# Patient Record
Sex: FEMALE | Race: BLACK OR AFRICAN AMERICAN
[De-identification: names, ages, dates, MRNs, and addresses within clinical notes are randomized per-mention and may not be internally consistent; named-entity substitution may affect disease eponyms.]

---

## 2018-01-11 ENCOUNTER — HOSPITAL ENCOUNTER (INPATIENT)
Dept: HOSPITAL 74 - JDEL | Age: 43
LOS: 2 days | Discharge: HOME | DRG: 560 | End: 2018-01-13
Attending: OBSTETRICS & GYNECOLOGY | Admitting: OBSTETRICS & GYNECOLOGY
Payer: COMMERCIAL

## 2018-01-11 VITALS — BODY MASS INDEX: 28.3 KG/M2

## 2018-01-11 DIAGNOSIS — Z3A.37: ICD-10-CM

## 2018-01-11 LAB
ANION GAP SERPL CALC-SCNC: 7 MMOL/L (ref 8–16)
APTT BLD: 25 SECONDS (ref 26.9–34.4)
ARTERIAL BLOOD GAS PCO2: 40 MMHG (ref 35–45)
BASE EXCESS BLDA CALC-SCNC: -1.7 MEQ/L (ref -2–2)
BASOPHILS # BLD: 0.4 % (ref 0–2)
BUN SERPL-MCNC: 7 MG/DL (ref 7–18)
CALCIUM SERPL-MCNC: 8.3 MG/DL (ref 8.5–10.1)
CHLORIDE SERPL-SCNC: 106 MMOL/L (ref 98–107)
CO2 SERPL-SCNC: 25 MMOL/L (ref 21–32)
CREAT SERPL-MCNC: 0.6 MG/DL (ref 0.55–1.02)
DEPRECATED RDW RBC AUTO: 13 % (ref 11.6–15.6)
EOSINOPHIL # BLD: 1 % (ref 0–4.5)
GLUCOSE SERPL-MCNC: 107 MG/DL (ref 74–106)
HCT VFR BLD CALC: 33.8 % (ref 32.4–45.2)
HGB BLD-MCNC: 11.5 GM/DL (ref 10.7–15.3)
INR BLD: 0.96 (ref 0.82–1.09)
LYMPHOCYTES # BLD: 20.3 % (ref 8–40)
MCH RBC QN AUTO: 30.1 PG (ref 25.7–33.7)
MCHC RBC AUTO-ENTMCNC: 33.9 G/DL (ref 32–36)
MCV RBC: 88.6 FL (ref 80–96)
MONOCYTES # BLD AUTO: 7.9 % (ref 3.8–10.2)
NEUTROPHILS # BLD: 70.4 % (ref 42.8–82.8)
PH BLDV: 7.34 [PH] (ref 7.32–7.42)
PLATELET # BLD AUTO: 173 K/MM3 (ref 134–434)
PMV BLD: 9.9 FL (ref 7.5–11.1)
PO2 BLDA: 44.8 MMHG (ref 80–100)
POTASSIUM SERPLBLD-SCNC: 4 MMOL/L (ref 3.5–5.1)
PT PNL PPP: 10.8 SEC (ref 9.98–11.88)
RBC # BLD AUTO: 3.82 M/MM3 (ref 3.6–5.2)
SAO2 % BLDA: 88.6 % (ref 90–98.9)
SODIUM SERPL-SCNC: 138 MMOL/L (ref 136–145)
VENOUS PC02: 45.5 MMHG (ref 38–52)
VENOUS PO2: 34.4 MMHG (ref 28–48)
WBC # BLD AUTO: 9.9 K/MM3 (ref 4–10)

## 2018-01-11 RX ADMIN — Medication SCH MLS/HR: at 20:30

## 2018-01-11 RX ADMIN — Medication SCH MLS/HR: at 19:30

## 2018-01-11 NOTE — HP
Past Medical History





- Primary Care Physician


PCP:: Sharri Griffith





- Admission


Chief Complaint: 42 yrs (AMA), G10,  , 37 weeks gestation , onset LP since 

4.00AM.  admited in early labor


History of Present Illness: 


PNC at , 2 [facility] . Wt gain 56 lbs .


Prenatal work up : A pos, Hbsag neg, Rpr nr, Rubella immune, Sickle cell neg  , 

1 hr Gtt  55, Hiv neg, Gbs neg , Quantifero neg , pap nilm, gc/ct neg 


AMA , Genetic counselling obtained with M, . MaternaT-21 ne, Afp neg 


Anatomy sono normal. Last sono on 17 34.1 wks, , JOAQUIN 14.7cm, , EFW 5'4"(36

%tile), BPP 8/8 .


prenatal course uneventful





History Source: Patient


Limitations to Obtaining History: No Limitations





- Past Medical History


CNS: No: CVA


Cardiovascular: No: HTN, Murmur


Pulmonary: No: Asthma, Bronchitis


Gastrointestinal: Yes: Constipation, Hemorrhoids


Hepatobiliary: No: Hepatitis B


Renal/: No: UTI


...: 10


...Para: 4


...Term: 3


...: 1


...Spon : 2


...Induced : 3


...LMP: 17


... Weeks Gestation by Dates: 40.6


...EDC by Dates: 18


...EDC by Sono: 18 (37 weeks )


Additional OB History: G1 1996 35 weeks  4'2".  G2 sp ab  21 weeks 

still birth.  G3 1999 40 weeks  7'15".  G4 InD AB.  G5 2006 40 

weeks  8'0".  G6 Ind Ab.  G7 2011 40 Weeks  6'9" H/O PPH , H/o 

pack cell transfusion x2.  G8 Ind Ab.  G9 Sp Ab 2016


Heme/Onc: Yes: Anemia


Infectious Disease: Yes: STD's (h/o trichomoniassi in past).  No: AIDS, HIV, 

Tuberculosis


Psych: Yes: Other (none as per patient)


Musculoskeletal: Yes: Other (none)


Endocrine: No: Diabetes Mellitus, Hypothyroidism





- Past Surgical History


Past Surgical History: Yes: None


Hx Myomectomy: No


Hx Transabdominal Cerclage: No





- Smoking History


Have you smoked in the past 12 months: No





- Alcohol/Substance Use


History of Substance Use: reports: None





Home Medications





- Allergies


Allergies/Adverse Reactions: 


 Allergies











Allergy/AdvReac Type Severity Reaction Status Date / Time


 


Sulfa (Sulfonamide Allergy Severe Difficulty Verified 18 12:59





Antibiotics)   Breathing  


 


aspirin Allergy Intermediate Swelling Verified 18 13:34


 


Penicillins Allergy Intermediate Swelling Verified 18 13:34














- Home Medications


Home Medications: 


Ambulatory Orders





Prenatal Vitamins (Sjr) - 1 tab PO DAILY 18 











Physical Exam - Maternity


Vital Signs: 


 Vital Signs











Temperature  97.5 F L  18 12:00


 


Pulse Rate  109 H  18 12:00


 


Respiratory Rate  20   18 12:00


 


Blood Pressure  108/72   18 12:00


 


O2 Sat by Pulse Oximetry (%)      











Constitutional: Yes: Well Nourished, Mild Distress


Eyes: Yes: WNL


HENT: Yes: WNL, Normocephalic


Neck: Yes: WNL


Cardiovascular: Yes: WNL


Lungs: Clear to auscultation


Breast(s): Yes: WNL





- Abdominal Exam/OB


Fundal Height: 38


Number of Fetuses: Single


Fetal Presentation: Vertex (exam at 2.00 pm)


Contractions: Yes


Regularity: Irregular (5-7-10)


Intensity: Mild/Mod


Fetal Monitor Mode: External


Fetal Heart Rate (range): 135 


Fetal Heart Rate Location: [facility]


Category: I


Accelerations: Uniform


Decelerations: None





- Vaginal Exam/OB


Vaginal Bleediing: Bloody Show


Dilatation (cm): 4


Effacement (%): 65


Amniotic Membrane Status: Intact


Fetal Presentation: Vertex/Position


Fetal Station: -3





- Physical Exam


Musculoskeletal: Yes: WNL


Extremities: Yes: WNL.  No: Calf Tenderness


Edema: LLE: 1+, RLE: 1+


Integumentary: Yes: WNL, Tattoos


Deep Tendon Reflex Grade: Normal +2


Psychiatric: Yes: WNL, Alert, Oriented





- Labs


Lab Results: 





 Laboratory Tests











  18





  16:00 16:00


 


WBC  9.9 


 


Hgb  11.5 


 


Hct  33.8 


 


Plt Count  173 


 


Neutrophils %  70.4 


 


Lymphocytes %  20.3 


 


Sodium   138


 


Potassium   4.0


 


Chloride   106


 


BUN   7


 


Creatinine   0.6


 


Random Glucose   107 H


 


Calcium   8.3 L














Problem List





- Problems


(1) AMA (advanced maternal age) multigravida 35+


Code(s): O09.529 - SUPERVISION OF ELDERLY MULTIGRAVIDA, UNSPECIFIED TRIMESTER   





(2) Grand multipara in labor


Code(s): O09.40 - SUPERVISION OF PREGNANCY W GRAND MULTIPARITY, UNSP TRIMESTER 

  


Qualifiers: 


   Trimester: third trimester   Qualified Code(s): O09.43 - Supervision of 

pregnancy with grand multiparity, third trimester   





(3) Pregnancy with 37 weeks completed gestation


Code(s): Z3A.37 - 37 WEEKS GESTATION OF PREGNANCY   





(4) Labor established


Code(s): JFH3613 -    





Assessment/Plan


42 yrs( AMA)  G10. , 37 weeks , admitted in labor 


GBS neg 


Plan vaginal delivery trial 


      pitocin augmentaion prn 


      stadol + phenrgan & or Epidural for labor analgesia

## 2018-01-11 NOTE — PN
Progress Note, Labor


  ** Vaginal Exam #1


Labor Exam Date: 01/11/18


Labor Exam Time: 18:30


Fetal Heart Rate (range): 140


Dilatation: 5


Effacement (%): 70


Amniotic Membrane Status: Ruptured (AROM clear moderate amount)


Fetal Presentation: Vertex/Position


Fetal Station: -2


Remarks: 


fhr cat-1


uc  q 3-6 min 


pt requests for pain meds 


rx iv stadol2 mg + phenrgan 25 mg stat 


 Selected Entries











  01/11/18





  18:00


 


Temperature 98.0 F


 


Pulse Rate 78


 


Blood Pressure 110/66














  ** Vaginal Exam #2


Labor Exam Date: 01/11/18


Labor Exam Time: 19:20


Fetal Heart Rate (range): 110-120


Dilatation: 10


Effacement (%): 100


Amniotic Membrane Status: Ruptured


Fetal Presentation: Vertex/Position


Fetal Station: +2


Remarks: 


fhr cat-2


uc 2-3 min 


pt pushing 


 Selected Entries











  01/11/18 01/11/18





  18:00 19:00


 


Temperature 98.0 F 


 


Pulse Rate 78 82


 


Blood Pressure 110/66 116/69

## 2018-01-11 NOTE — PN
Delivery





- Delivery


Vaginal Delivery: No Problems, Spontaneous (cord around neck x1 loop loose , 

unwinded , before delivery of shoulder. prophylactically iv Pitocin 20 IU put 

in 1000 ml n saline started immdiately after delivery of the baby,. Im 

Methergine 0.2 mg im was given after delivery of placenta. trickling of blood 

was noted . MEU done . Bladder catheterized & emptied 500 ml josiah color urine. 

Perineum & v agina & cervix intact .)


Episiotomy/Laceration: None


EBL (cc): 500





Delivery, Single Birth





- Stages of Labor


Date 1st Stage Initiatied: 18


Time 1st Stage Initiated: 04:00


Date 2nd Stage Initiated: 18


Time 2nd Stage Initiated: 19:20


Date of Delivery: 18


Time of Delivery: 19:28


Date Placenta Delivered: 18


Time Placenta Delivered: 19:33


Placenta: Yes: Spontaneous, Uterine Exploration





- Condition of Infant


Pediatrician/Neonatologist Present: No


Infant Gender: Female


Birth Weight: 5 lb 14 oz


Position: Left, OA





- Apgar


  ** 1 Minute


Apgar Total Score: 9





  ** 5 Minutes


Apgar Total Score: 9





-  Feeding Plan


Initial Plan: Elected not to breastfeed exclusively throughout hospitalization





Remarks





- Remarks


Remarks: 


42 Yrs (AMA),  , 37 weeks admitted in labor .


gbs neg . 


pnc at [address] [place] [address] 


Iv pitocin augmentation was given 


Iv stadol + phenrgan for labor analgesia was given 


Atonic PPH is noted 


  


20.05 hr re exam .: blood trickling from vagina 


                           MEU done again 100 ml blood cllots removed, 


                           ut is not firm.consistently 


                           Rx IM Hemabate 250 mcg stat 


                           v/s BP 93/56  , Pulse 66/min

## 2018-01-12 LAB
BASOPHILS # BLD: 0.4 % (ref 0–2)
DEPRECATED RDW RBC AUTO: 12.8 % (ref 11.6–15.6)
EOSINOPHIL # BLD: 1.1 % (ref 0–4.5)
HCT VFR BLD CALC: 27.9 % (ref 32.4–45.2)
HGB BLD-MCNC: 9.4 GM/DL (ref 10.7–15.3)
LYMPHOCYTES # BLD: 24.7 % (ref 8–40)
MCH RBC QN AUTO: 29.6 PG (ref 25.7–33.7)
MCHC RBC AUTO-ENTMCNC: 33.7 G/DL (ref 32–36)
MCV RBC: 88 FL (ref 80–96)
MONOCYTES # BLD AUTO: 6.2 % (ref 3.8–10.2)
NEUTROPHILS # BLD: 67.6 % (ref 42.8–82.8)
PLATELET # BLD AUTO: 159 K/MM3 (ref 134–434)
PMV BLD: 9.8 FL (ref 7.5–11.1)
RBC # BLD AUTO: 3.17 M/MM3 (ref 3.6–5.2)
WBC # BLD AUTO: 16 K/MM3 (ref 4–10)

## 2018-01-12 RX ADMIN — FERROUS SULFATE TAB EC 324 MG (65 MG FE EQUIVALENT) SCH MG: 324 (65 FE) TABLET DELAYED RESPONSE at 17:05

## 2018-01-12 RX ADMIN — ACETAMINOPHEN PRN MG: 325 TABLET ORAL at 09:56

## 2018-01-12 RX ADMIN — FERROUS SULFATE TAB EC 324 MG (65 MG FE EQUIVALENT) SCH MG: 324 (65 FE) TABLET DELAYED RESPONSE at 09:57

## 2018-01-12 RX ADMIN — IBUPROFEN PRN MG: 600 TABLET, FILM COATED ORAL at 22:14

## 2018-01-12 RX ADMIN — IBUPROFEN PRN MG: 600 TABLET, FILM COATED ORAL at 09:58

## 2018-01-12 RX ADMIN — ACETAMINOPHEN PRN MG: 325 TABLET ORAL at 00:12

## 2018-01-12 RX ADMIN — ACETAMINOPHEN PRN MG: 325 TABLET ORAL at 22:13

## 2018-01-12 RX ADMIN — Medication SCH TAB: at 09:59

## 2018-01-12 NOTE — PN
Progress Note (short form)





- Note


Progress Note: 





ppd 1 doing well, no c/o ,no excess vaginal bleeding


 CBC, BMP





 01/12/18 06:45 





 01/11/18 16:00 





 Last Vital Signs











Temp Pulse Resp BP Pulse Ox


 


 98.0 F   93 H  18   107/63   100 


 


 01/12/18 06:00  01/12/18 06:00  01/12/18 06:00  01/12/18 06:00  01/11/18 21:00








abdomen soft, non tender, uterus firm, non tender 


lochia mild


no calf tenderness 


impression ppd 1 doing well, mild anemia , advised iron , vit

## 2018-01-13 VITALS — SYSTOLIC BLOOD PRESSURE: 96 MMHG | HEART RATE: 86 BPM | TEMPERATURE: 97.2 F | DIASTOLIC BLOOD PRESSURE: 60 MMHG

## 2018-01-13 RX ADMIN — Medication SCH TAB: at 09:33

## 2018-01-13 RX ADMIN — FERROUS SULFATE TAB EC 324 MG (65 MG FE EQUIVALENT) SCH MG: 324 (65 FE) TABLET DELAYED RESPONSE at 08:11

## 2018-01-13 NOTE — PN
Progress Note (short form)





- Note


Progress Note: 





ppd 2 doing well, no c/o 


abdomen soft, no distension, uterus firm


lochia mild 


no calf tenderness 


 CBC, BMP





 01/12/18 06:45 





 01/11/18 16:00 





 Last Vital Signs











Temp Pulse Resp BP Pulse Ox


 


 97.2 F L  86   20   96/60   100 


 


 01/13/18 08:30  01/13/18 08:30  01/13/18 08:30  01/13/18 08:30  01/11/18 21:00








plan d/c home, rtc 4 weeks

## 2018-01-16 NOTE — DS
Physical Exam-GYN


Vital Signs: 


 Vital Signs











Temperature  97.2 F L  18 08:30


 


Pulse Rate  86   18 08:30


 


Respiratory Rate  20   18 08:30


 


Blood Pressure  96/60   18 08:30


 


O2 Sat by Pulse Oximetry (%)  100   18 21:00











Constitutional: Yes: Well Nourished, Pallor


Eyes: Yes: WNL


HENT: Yes: WNL, Normocephalic


Neck: Yes: WNL


Cardiovascular: Yes: WNL


Respiratory: Yes: WNL


Gastrointestinal: Yes: WNL


...Rectal Exam: Yes: WNL


Renal/: Yes: WNL


....Post Partum: Yes: Uterus firm, Uterus non-tender, Moderate lochia rubra (

perineum intact)


Breast(s): Yes: WNL


Musculoskeletal: Yes: WNL


Extremities: Yes: WNL.  No: Calf Tenderness


Edema: Yes


Edema: LLE: 1+, RLE: 1+


Integumentary: Yes: WNL, Tattoos


Neurological: Yes: WNL


...Motor Strength: WNL


Psychiatric: Yes: WNL


Labs: 


 CBC, BMP





 18 06:45 





 18 16:00 











Delivery





- Delivery


Vaginal Delivery: No Problems, Spontaneous (cord around neck x1 loop loose , 

unwinded , before delivery of shoulder. prophylactically iv Pitocin 20 IU put 

in 1000 ml n saline started immdiately after delivery of the baby,. Im 

Methergine 0.2 mg im was given after delivery of placenta. trickling of blood 

was noted . MEU done . Bladder catheterized & emptied 500 ml josiah color urine. 

Perineum & v agina & cervix intact .)


Type of Anesthesia: None


Episiotomy/Laceration: None


EBL (cc): 500





Delivery, Single Birth





- Stages of Labor


Date 1st Stage Initiatied: 18


Time 1st Stage Initiated: 04:00


Date 2nd Stage Initiated: 18


Time 2nd Stage Initiated: 19:20


Date of Delivery: 18


Time of Delivery: 19:28


Time Placenta Delivered: 19:33


Placenta: Yes: Spontaneous, Uterine Exploration





- Condition of Infant


Pediatrician/Neonatologist Present: No


Infant Gender: Female


Birth Weight: 5 lb 14 oz


Position: Left, OA


Total Hours ROM (Hrs/Mins): 1 hour 3 minutes





- Apgar


  ** 1 Minute


Apgar Total Score: 9





  ** 5 Minutes


Apgar Total Score: 9





-  Feeding Plan


Initial Plan: Elected not to breastfeed exclusively throughout hospitalization





Remarks





- Remarks


Remarks: 


42 Yrs (AMA),  , 37 weeks admitted in labor .


gbs neg . 


pnc at [address] [place] [address] 


Iv pitocin augmentation was given 


Iv stadol + phenrgan for labor analgesia was given 


Atonic PPH is noted 


  


20.05 hr re exam .: blood trickling from vagina 


                           MEU done again 100 ml blood cllots removed, 


                           ut is not firm.consistently 


                           Rx IM Hemabate 250 mcg stat 


                           v/s BP 93/56  , Pulse 66/min .





pp course uneventful


pt hemodynamically stable 


anemia counselled 


discharge 18 














Discharge Summary


Reason For Visit: LABOR ADMISSION





- Instructions


Diet, Activity, Other Instructions: 


Return to [address] [place] [address] in 6weeks for postpartum check up, call clinic for 

an appointment.


Referrals: 


Sharri Griffith MD [Staff Physician] - 


Disposition: HOME





- Home Medications


Comprehensive Discharge Medication List: 


Ambulatory Orders





Prenatal Vitamins (Sjr) - 1 tab PO DAILY 18